# Patient Record
Sex: FEMALE | Race: WHITE | Employment: FULL TIME | URBAN - METROPOLITAN AREA
[De-identification: names, ages, dates, MRNs, and addresses within clinical notes are randomized per-mention and may not be internally consistent; named-entity substitution may affect disease eponyms.]

---

## 2018-08-30 NOTE — TELEPHONE ENCOUNTER
No record of pt ever being seen by [facility]. Confirmed with [facility] directly that she has never seen this patient. Dr Houston Monteiro office was called back and notified.

## 2018-08-30 NOTE — TELEPHONE ENCOUNTER
Mirian Crespo called in form Dr. Garrick Tobias office ph: [phone] requesting to have pt's most recent OV date and most recent A1C lab results faxed to [phone].   Please advise of confirmation

## 2022-11-06 ENCOUNTER — OFFICE VISIT (OUTPATIENT)
Dept: FAMILY MEDICINE CLINIC | Facility: CLINIC | Age: 67
End: 2022-11-06
Payer: COMMERCIAL

## 2022-11-06 VITALS
TEMPERATURE: 97 F | HEIGHT: 69 IN | SYSTOLIC BLOOD PRESSURE: 174 MMHG | HEART RATE: 96 BPM | DIASTOLIC BLOOD PRESSURE: 86 MMHG | RESPIRATION RATE: 20 BRPM | WEIGHT: 250 LBS | BODY MASS INDEX: 37.03 KG/M2 | OXYGEN SATURATION: 95 %

## 2022-11-06 DIAGNOSIS — R05.1 ACUTE COUGH: ICD-10-CM

## 2022-11-06 DIAGNOSIS — J32.9 RHINOSINUSITIS: Primary | ICD-10-CM

## 2022-11-06 DIAGNOSIS — J31.0 RHINOSINUSITIS: Primary | ICD-10-CM

## 2022-11-06 PROCEDURE — 3079F DIAST BP 80-89 MM HG: CPT

## 2022-11-06 PROCEDURE — 3077F SYST BP >= 140 MM HG: CPT

## 2022-11-06 PROCEDURE — 99203 OFFICE O/P NEW LOW 30 MIN: CPT

## 2022-11-06 PROCEDURE — 3008F BODY MASS INDEX DOCD: CPT

## 2022-11-06 RX ORDER — DOXYCYCLINE HYCLATE 100 MG
100 TABLET ORAL 2 TIMES DAILY
Qty: 20 TABLET | Refills: 0 | Status: SHIPPED | OUTPATIENT
Start: 2022-11-06 | End: 2022-11-16